# Patient Record
(demographics unavailable — no encounter records)

---

## 2020-04-18 NOTE — CT
BRAIN CT WITHOUT IV CONTRAST:

 

History: 

Injury from trauma. Patient is on blood thinners. 

 

FINDINGS: 

No focal mass or midline shift. No intra or extraaxial hemorrhage. Sinuses and mastoids are clear of 
acute process. 

 

IMPRESSION: 

No significant acute intracranial process. No mass or bleed. Mild atrophy.

 

POS: [facility]

## 2020-04-18 NOTE — HP
REQUESTING PHYSICIAN:  Dr. Patterson.



ATTENDING SURGEON:  Dr. Bunch.



CONSULTATIONS:  Orthopedics, Dr. Peters.



HISTORY OF PRESENT ILLNESS:  The patient is an 82-year-old woman, who was in her

living room, moving a blanket when she stepped on it causing her to slip and fall

onto her left hip.  Unfortunately, it was late at night and she was away from her

, who could not hear her and she was not found until the next morning.  The

patient denied loss of consciousness or hitting her head.  She was just unable to

ambulate or contact her .  She was eventually brought to the emergency

department by ground EMS, where she underwent evaluation and examination and was

noted to have a left hip fracture, at which time we were asked to evaluate the

patient for admission and obtain Orthopedic consultation. 



ALLERGIES:  NONE.



CURRENT MEDICATIONS:  

1. Simvastatin.

2. Lorazepam.

3. Trazodone.

4. Spironolactone.

5. Librax.

6. Amlodipine.

7. Metoprolol.

8. Savaysa.

9. Furosemide.

10. Fluoxetine.

11. L-thyroxine.



PAST MEDICAL HISTORY:  Hypothyroidism, hyperlipidemia, hypertension, atrial

fibrillation, depression, and anxiety. 



PAST SURGICAL HISTORY:  Appendectomy.



SOCIAL HISTORY:  The patient lives at home with her .  She denies drug or

tobacco use.  She drinks "maybe" one glass of wine a day. 



REVIEW OF SYSTEMS:  10-point review of systems is negative as otherwise stated.



PHYSICAL EXAMINATION:

VITAL SIGNS:  Blood pressure 159/99, heart rate 104, respirations 18, oxygen

saturation is 96% on room air, and temperature is 98. 

GENERAL:  The patient is resting comfortably in bed.  She is awake, alert,

conversant, and appropriate.  [place] Coma Scale is 15. 

HEENT:  Head is normocephalic and atraumatic.  Eyes, extraocular motion intact.

PERRLA bilaterally.  Ears are atraumatic without discharge.  Nose is atraumatic

without discharge.  Oropharynx is clear. 

NECK:  Nontender.  Trachea is midline.  No JVD. 

CHEST:  Clear to auscultation with good inspiratory and expiratory effort. 

HEART:  Regular rate and rhythm. 

ABDOMEN:  Soft, flat, nontender with active bowel sounds. 

PELVIS:  Stable with tenderness to palpation to the left hip consistent with her

fracture. 

EXTREMITIES:  Neurovascularly intact x4. 

BACK:  By report is atraumatic and nontender.



LABORATORY FINDINGS:  White blood cell count 11.3, hemoglobin 14.8, hematocrit 45.0,

platelets 179.  Sodium 134, potassium 4.5, chloride 101, CO2 of 21, BUN 19,

creatinine 0.96, glucose 148.  LFTs are unremarkable.  PT 15, INR 1.2, PTT 25.2. 



RADIOGRAPHS:  CT of the brain without contrast shows no acute intracranial process,

mass or bleed.  CT of the C-spine without contrast shows no fracture, dislocation,

or other acute process.  CT of the lumbar spine without contrast shows no acute

fracture or dislocation.  AP pelvis shows a minimally displaced left femoral neck

fracture.  Views of the left hip again display a minimally displaced irregular

fracture through the left femoral neck with some foreshortening and varus deformity.

 AP chest x-ray shows no significant intrathoracic disease. 



ASSESSMENT AND PLAN:  

1. Status post ground level fall with delayed presentation.

2. Left femoral neck fracture.

3. Acute pain secondary to above.

4. History of hypertension, hypothyroidism, anxiety, depression, and atrial

fibrillation. 

5. Bleeding dyscrasia secondary to Savaysa/edoxaban use.



PLAN:  Plan will be to admit the patient to the surgical floor due to her edoxaban

use.  She will not be able to have surgery today, but we will have surgery tomorrow.

 We will make her n.p.o. after midnight.  We will do pain control, pulmonary toilet,

gastritis, mechanical VTE prophylaxis.  We will resume her other medications as

indicated.  The evaluation, examination, laboratory, and radiographic findings will

be discussed with Dr. Bunch after this dictation.  Dr. Peters has been made

aware of the patient and agrees with the plan for surgery tomorrow. 







Job ID:  [id number]

## 2020-04-18 NOTE — RAD
LEFT HIP TWO VIEWS:

 

History: 

Injury from a fall. 

 

FINDINGS: 

Minimally displaced irregular fracture through the left femoral neck with foreshortening and varus de
formity. 

 

Minimally displaced irregular fracture through the left femoral neck with some foreshortening and shekhar
us deformity. 

 

POS: [facility]

## 2020-04-18 NOTE — RAD
CHEST ONE VIEW:

 

History: 

Injury from trauma. 

 

Comparison: 

12-13-02

 

FINDINGS: 

Heart size is normal. The lungs are clear. No confluent pneumonia, overt edema, or pleural effusion. 


 

IMPRESSION: 

No significant acute intrathoracic disease. 

 

POS: [facility]

## 2020-04-18 NOTE — CT
CERVICAL SPINE CT SCAN WITHOUT IV CONTRAST:

 

History: 

Injury from trauma. 

 

FINDINGS: 

Multilevel disc osteophytosis and facet arthrosis with some variable severity canal, lateral recess a
nd foraminal stenosis. No evidence for acute fracture or facet dislocation. No prevertebral soft tiss
ue swelling. 

 

IMPRESSION: 

No fracture, dislocation, or other acute process. 

 

POS: [facility]

## 2020-04-18 NOTE — CON
DATE OF CONSULTATION:  04/18/2020



CHIEF COMPLAINT:  Left hip pain.



HISTORY OF PRESENT ILLNESS:  Ms. Nuñez is an 82-year-old female who got up last

night when she tripped and fell.  She tripped over a blanket.  She was unable to

ambulate.  She was unable to call her  at that time.  This morning, she was

found and was taken to the emergency department by EMS.  She had been found to have

a displaced left femoral neck fracture.  She had been admitted to the hospital now

by the [facility] Surgery [facility] Tra[facility].  She has been given pain medication.  She

has been comfortable recently.  She does have hip pain with movement.  She denies

loss of consciousness.  She normally ambulates without any assistive device. 



ALLERGIES:  NO KNOWN DRUG ALLERGIES.



MEDICATIONS:  Please see chart.  Of note, the patient is on a blood thinner called

Savaysa. 



PAST MEDICAL HISTORY:  

1. Hyperlipidemia.

2. Hypertension.

3. Hypothyroidism.

4. AFib.

5. Depression.

6. Anxiety.



PAST SURGICAL HISTORY:  Appendectomy.



SOCIAL HISTORY:  The patient denies tobacco or drug use.  She drinks minimal alcohol.



REVIEW OF SYSTEMS:  Positive for left hip pain with movement.  Otherwise, negative

10-point review of systems. 



IMAGING STUDIES:  X-rays of the left hip and pelvis demonstrate a displaced left

femoral neck fracture with shortening. 



PHYSICAL EXAMINATION:

VITAL SIGNS:  Temperature is 98.4, pulse is 102, blood pressure is 165/90, and 96%

on room air. 

GENERAL:  She is alert, lying supine, in no apparent distress. 

RESPIRATORY:  Breathing comfortably. 

ABDOMEN:  Soft, nontender, and nondistended. 

HEENT:  Normocephalic and atraumatic. 

CARDIOVASCULAR:  Pulses palpable and regular peripherally. 

MUSCULOSKELETAL:  The patient's left lower extremity is shortened and externally

rotated.  She has pain with hip motion.  She has intact skin.  She is able to flex

and extend the foot and ankle.  She has palpable dorsalis pedis pulse.  Upper

extremities are atraumatic. 



IMPRESSION:  Left femoral neck fracture in an 82-year-old female.



PLAN:  At this point, the patient will need to go to the operating room.  We will

wait until tomorrow to give her blood thinner medication time to resolve.  She will

need a hemiarthroplasty of the hip to allow her to mobilize and promote early out of

bed exercise.  Goal is to prevent complications of prolonged bedrest and others.

She is at risk for blood loss, nerve or vascular injury, hardware failure,

dislocation, wound complications, and others.  She wants to proceed with surgery.  I

will keep her n.p.o. at midnight.  She will have antibiotics on-call to the

operating room as well. 







Job ID:  [id number]

## 2020-04-18 NOTE — CT
LUMBAR SPINE CT SCAN WITHOUT IV CONTRAST:

 

History: 

Injury from trauma. 

 

FINDINGS: 

Multilevel disc osteophytosis most marked at L4-5 with moderate to severe associated canal and latera
l recess and foraminal stenosis. Moderate stenosis at the L3-4 level. Extensive facet arthrosis. No e
vidence for acute fracture or dislocation. 

 

IMPRESSION: 

No acute fracture or dislocation. Spondylosis with disc osteophytosis and facet arthrosis with modera
te to severe stenosis at L4-5 and mild to moderate stenosis at L3-4. 

 

POS: [facility]

## 2020-04-18 NOTE — RAD
AP PELVIS ONE VIEW:

 

History: 

Injury from trauma, pain following a fall. 

 

FINDINGS: 

There is evidence for an irregular fracture through the left femoral neck with some displacement and 
foreshortening. The pelvis otherwise appears intact. The right hip appears unremarkable. 

 

IMPRESSION: 

Irregular minimally displaced left femoral neck fracture with foreshortening and varus deformity.

 

POS: [facility]

## 2020-04-18 NOTE — PRG
DATE OF SERVICE:  



SUBJECTIVE:  Ms. Nuñez is currently in surgical floor.  Patient was seen on rounds

this evening.  Patient reports pain is well controlled.  She is able to tolerate

with her regular diet.  Her vital signs stable.  Urine adequate. 



OBJECTIVE:  GENERAL:  Currently, patient is lying in bed comfortable with no acute

respiratory distress. 

VITAL SIGNS:  Stable. 

LUNGS:  Clear bilaterally. 

HEART:  Regular rate and rhythm. 

ABDOMEN:  Soft.  Nondistended. 

EXTREMITIES:  Neurovascularly intact x4. 

NEUROLOGIC:  No focal neurologic deficits.



ASSESSMENT:  

1. Status post ground level fall.

2. Left femoral neck fracture.

3. History of hypertension; hypothyroid; anxiety and depression; and atrial

fibrillation, on edoxaban use. 



PLAN:  Patient will be continued supportive care.  Continue nonpharmacological DVT

prophylaxis.  Await for surgery tomorrow. 







Job ID:  [id number]

## 2020-04-19 NOTE — OP
DATE OF PROCEDURE:  04/19/2020



OPERATION:  Left hip bipolar hemiarthroplasty.



PREOPERATIVE DIAGNOSIS:  Left femoral neck fracture.



POSTOPERATIVE DIAGNOSIS:  Left femoral neck fracture.



COMPLICATIONS:  None.



ESTIMATED BLOOD LOSS:  150 mL.



FIRST ASSISTANT:  Zofia Pettit PA-C.



IMPLANTS:  DePuy size 5 basic press-fit [place] stem, size 1.5 femoral head with a 41

mm bipolar shell. 



INDICATIONS:  Ms. Nñuez is an 82-year-old female, who has fallen and fractured her

left femoral neck.  She has been indicated for hemiarthroplasty of the hip to

restore the ability to mobilize and prevent complications of prolonged bedrest.

Risks have been reviewed.  Risks to include infection, pain, scarring, nerve or

vascular injury, fracture, DVT, instability of the hip, and others. 



DESCRIPTION OF OPERATION:  Ms. Nuñez was identified in the preoperative holding

area.  Her correct extremity was marked.  She was carried to the operating room.

She was positioned supine.  General anesthesia was induced.  A multidisciplinary

time-out was performed.  The left lower extremity was prepped and draped in sterile

fashion. 



We began the procedure with a posterior approach to the hip.  We dissected down

through the subcutaneous tissues to the fascia.  The fascia was opened.  We exposed

the short external rotators of the hip at this point.  We then proceeded to expose

the capsule.  A capsulotomy was performed.  We then removed the broken femoral head

and neck fragments.  We performed a new osteotomy with an oscillating saw.  At this

point, we proceeded to prepare the canal.  We opened the proximal femur.  We reamed

sequentially up to a size 5.  We then broached from a size 2 to a size 5.  We

trialed off our size 5 broach.  At this point, we reduced the hip.  Leg length was

appropriate.  The patient's hip was stable in all rotation.  We removed trial

components.  We then placed our final components.  We again reduced the hip and

checked stability and leg lengths.  We were satisfied with this.  We then closed

with #5 Ethibond suture through drill holes in the trochanter.  We closed the

capsule and the piriformis tendon.  Finally, we finished closure in layers.  A

sterile dressing was applied.  The patient was taken to the recovery room at this

point in good condition. 







Job ID:  [id number]

## 2020-04-19 NOTE — PRG
DATE OF SERVICE:  04/19/2020



SUBJECTIVE:  The patient is hospital day #2, status post ground-level fall in which

she sustained a left femoral neck fracture.  Today, she underwent operative

procedure by Dr. Peters.  She has just returned to the surgical floor.  She

reportedly tolerated her procedure well.  She has a diet order, and her pain is

currently controlled.  They will re-evaluate this as she awakens. 



OBJECTIVE:  VITAL SIGNS:  Temperature is 98.4, heart rate 98, blood pressure 114/70,

respirations 20, and oxygen saturation 98% on 2 L via nasal cannula. 

GENERAL:  The patient is resting comfortably in bed.  She is asleep and drowsy, but

will awaken and answer questions for me. 

HEENT:  Unremarkable. 

LUNGS:  Clear to auscultation bilaterally with moderate inspiratory and expiratory

effort. 

HEART:  Irregularly irregular rate and rhythm consistent with her atrial

fibrillation. 

ABDOMEN:  Soft and nondistended with hypoactive bowel sounds. 

EXTREMITIES:  Neurovascularly intact x4.  Postop dressing is clean, dry, and intact.



LABORATORY DATA:  White blood cell count 8.8, hemoglobin 12.9, hematocrit 39.4,

platelets 152. 



Sodium 137, potassium 4.8, chloride 103, CO2 of 26, BUN 25, creatinine 1.37, and

glucose 116. 



There are no radiographs reviewed this morning.



ASSESSMENT AND PLAN:  

1. Hospital day #2, status post ground-level fall.

2. Immediately postop from open reduction and internal fixation of left femoral neck

fracture. 

3. History of hypertension, hypothyroidism, anxiety, depression, and atrial

fibrillation. 

4. Bleeding dyscrasia, secondary to Savaysa/edoxaban use.

5. Acute kidney injury.



PLAN:  Plan will be to repeat the patient's labs this afternoon in light of her

edoxaban use and to recheck her renal function.  The patient had not received any

nephrotoxic medications to include IV contrast or NSAIDs, and she was hydrated

overnight.  We will continue hydration.  Encourage physical and occupational

therapy.  Resume diet and discuss placement tomorrow. 







Job ID:  [id number]

## 2020-04-19 NOTE — RAD
AP PELVIS ONE VIEW:

 

HISTORY:

Status post hemiarthroplasty.

 

COMPARISON:

04/18/2020

 

FINDINGS:

Status post left total hip replacement without dislocation or periprosthetic fracture or other acute 
process.

 

IMPRESSION:

Status post left total hip replacement.

 

POS: [facility]

## 2020-04-19 NOTE — RAD
LEFT HIP TWO VIEWS:

 

HISTORY:

Status post left hip replacement.

 

FINDINGS/IMPRESSION:

Recent total left hip replacement without dislocation or periprosthetic fracture.

 

POS: [facility]

## 2020-04-20 NOTE — PRG
DATE OF SERVICE:  04/20/2020



SUBJECTIVE:  The patient is hospital day 3, postop day 1, status post ground level

fall, she sustained a left femoral neck fracture.  Yesterday, she underwent left hip

bipolar hemiarthroplasty, which she did well with.  Her labs this morning showed

slightly increase again in her creatinine, which the patient states that she has had

a history of this in the past.  She has a nephrologists that she sees.  Otherwise,

she is tolerating a diet.  Her pain is controlled.  She began working with physical

and occupational therapy.  She is currently awaiting evaluation for inpatient rehab. 



PHYSICAL EXAMINATION:

VITAL SIGNS:  Temperature 97.6, heart rate 96, blood pressure 105/56, respirations

18, oxygen saturations 94% on room air. 

GENERAL:  The patient is resting comfortably in bed.  She is awake, alert, oriented,

conversant, and appropriate.  Her [place] Coma Scale is 15. 

HEENT:  Unremarkable. 

LUNGS:  Clear to auscultation with good inspiratory and expiratory effort. 

HEART:  Regular rate and rhythm. 

ABDOMEN:  Soft, flat, nontender with active bowel sounds. 

EXTREMITIES:  Neurovascularly intact x4.  Postop dressing is clean, dry, intact.



LABORATORY FINDINGS:  White blood cell count 6.8, hemoglobin 10.1, hematocrit 31.3,

platelets 126.  Sodium 135, potassium 5.0, chloride 107, CO2 of 22, BUN 28,

creatinine 1.47, glucose 128, phosphorus 2.8, magnesium 2.8.  There are no

radiographs reviewed this morning. 



ASSESSMENT:  

1. Hospital day 3, status post ground level fall.

2. Postoperative day 1, status post left hip hemiarthroplasty for left femoral neck

fracture. 

3. History of hypertension, hypothyroidism, anxiety, depression, and atrial

fibrillation. 

4. Acute on chronic kidney injury.

5. Bleeding dyscrasia secondary to edoxaban use.



PLAN:  Plan will be to continue supportive care.  Resume all home medications.  She

will be started on a bicarbonate drip overnight.  We will recheck her labs in the

morning.  We will have her evaluated for placement.  The evaluation, examination,

laboratory, and radiographic findings were done with Dr. Kitchen this morning during

rounds. 







Job ID:  [id number]

## 2020-04-20 NOTE — PRG
DATE OF SERVICE:  04/19/2020



SUBJECTIVE:  Ms. Nuñez remained in surgical floor.  The patient was seen on round

this evening.  The patient went back from the OR this evening after open reduction

and internal fixation of the left femoral neck fracture.  The patient reports pain

is well controlled.  She tolerated with her regular diet.  She has not yet worked

with physical therapy and occupational therapy.  Blood pressure a little bit soft in

which she has been on hypertension medication and went on fluid overnight tonight. 



OBJECTIVE:  GENERAL:  Currently, the patient lying in bed comfortable with no acute

respiratory distress. 

VITAL SIGNS:  Temperature 98, heart rate 88, blood pressure 92/58, O2 saturation 98%

on room air. 

LUNGS:  Clear bilaterally. 

HEART:  Regular rate and rhythm. 

ABDOMEN:  Soft.  Nondistended. 

EXTREMITIES:  Neurovascularly intact x4. 

NEUROLOGY:  No focal neurology deficits. 

SKIN:  Postop dressing clean, dry, intact.



ASSESSMENT:  

1. Status post ground level fall.

2. Left femoral neck fracture, status post ORIF of left femur neck fracture.

3. History of hypertension, hypothyroid, anxiety, depression, and atrial

fibrillation. 



PLAN:  Continue supportive care.  Continue pain control.  We will check a cortisol

level tomorrow.  Continue __________ on hypertension medication.  Continue IV fluid

overnight tonight.  Anticipate working with physical therapy and occupational

therapy tomorrow.  The patient will be working with the  tomorrow

__________ placement plan. 







Job ID:  [id number]

## 2020-04-21 NOTE — DIS
DATE OF ADMISSION:  04/18/2020



DATE OF DISCHARGE:  04/21/2020



ADMISSION DIAGNOSES:  Mechanical fall from standing, left femoral neck fracture.



DISCHARGE DIAGNOSES:  

1. Mechanical fall from standing, left femoral neck fracture.

2. Acute kidney injury on chronic kidney disease.



CONSULTING PHYSICIAN:  Dr. Peters of Orthopedic surgery.



PROCEDURES:  The patient went to the OR on April 19, 2020, and had a left hip

bipolar hemiarthroplasty. 



HOSPITAL COURSE:  The patient is an 82-year-old female, who presented to the

emergency department after a mechanical fall, was found to have a left femoral neck

fracture.  She went to the OR the day after admission with Dr. Peters and

received a left hip bipolar hemiarthroplasty.  On that day, the patient also

developed an acute kidney injury due to dehydration and received fluid

resuscitation.  She worked with Physical and Occupational Therapy and it was deemed

that she was safe to be discharged to acute rehab facility.  She did receive a

bicarb drip and ultimately her acute kidney injury on chronic kidney disease did

resolve.  She was discharged to rehab and restarted on all of her home medications.

The patient did receive tramadol to be continued at her rehab facility.  The [place]

Prescription Monitoring Program was accessed and the patient is not receiving any

other prescriptions for pain medications.  She does have a prescription for Ativan

which she takes as needed and has not been abusing. 



DISCHARGE DISPOSITION:  Acute rehab.



DISCHARGE CONDITION:  Satisfactory.



PHYSICAL EXAMINATION:

VITAL SIGNS:  Temperature 98.7, pulse 94, respirations 18, oxygen saturation 94% on

room air, blood pressure 128/78. 

GENERAL:  Well-appearing elderly female, sitting up in bed, having breakfast with no

signs of acute distress. 

PULMONARY:  Equal chest rise and fall.  No signs of acute respiratory distress. 

CARDIAC:  Regular rate and rhythm. 

GI:  Abdomen is soft, nontender, nondistended. 

EXTREMITIES:  2+ pulses in all extremities.  Gross motor sensation is intact.  No

significant swelling noted. 

NEURO:  GCS is 15.  Pupils are equal, round, and reactive to light bilaterally.



DISCHARGE INSTRUCTIONS:  The patient was discharged to acute rehab facility.  She is

weightbearing as tolerated in all extremities with posterior hip precautions to the

left lower extremity.  She has a regular diet.  She is to have PT/OT.  She can use

an incentive spirometer and a walker. 



DISCHARGE MEDICATIONS:  Include;

1. Tylenol.

2. Norvasc.

3. Chlordiazepoxide/clidinium.

4. Flexeril.

5. Prozac.

6. Synthroid.

7. Lorazepam.

8. Metoprolol.

9. MiraLAX.

10. Senokot-S.

11. Simvastatin.

12. Spironolactone.

13. Trazodone.

14. Edoxaban.

15. Lasix.

16. Tramadol.



FOLLOWUP APPOINTMENTS:  The patient is to follow up with Dr. Peters.  No need for

followup with Trauma Surgery. 



This is merely a summary of the patient's hospitalization.  For full details, please

see her medical record in its entirety. 







Job ID:  [id number]

## 2020-04-21 NOTE — PRG
DATE OF SERVICE:  04/20/2020



SUBJECTIVE:  Ms. Nuñez remained in the surgical floor.  The patient was seen on

rounds this evening.  The patient reports pain is well controlled. She is able to

tolerate with her regular diet. Vital signs have been stable.  Her urine is

adequate.  The patient denies nausea or vomiting. 



OBJECTIVE:  GENERAL:  Currently, the patient is lying in bed ,comfortable with no

acute respiratory distress. 

VITAL SIGNS: Stable. 

LUNGS: Clear bilaterally. 

HEART: Regular rate and rhythm. 

ABDOMEN: Soft, nondistended. 

EXTREMITIES: Neurovascularly intact x4.  Postop dressing clean, dry, intact.



ASSESSMENT:  

1. Status post ground level fall.

2. Left hip fracture, status post open reduction and internal fixation of left hip

fracture. 

3. History of hypertension, hypothyroid, anxiety, depression, and atrial

fibrillation. 



PLAN:  Continue supportive care.  Continue pain control.  Continue DVT prophylaxis.

Anticipate placement in rehabilitation facility tomorrow. 







Job ID:  [id number]